# Patient Record
(demographics unavailable — no encounter records)

---

## 2025-05-23 NOTE — DISCUSSION/SUMMARY
[de-identified] : Discussed the nature of the diagnosis and risk and benefits of different modalities of treatment. LT dorsal ganglion cyst  LT ECU tendinitis  X rays reviewed & discussed He will splint  Rx provided He will apply warm compresses & take MDP He will apply MRI  Rx provided RTO after MRI  RTO 3-4 weeks

## 2025-05-23 NOTE — HISTORY OF PRESENT ILLNESS
[8] : 8 [2] : 2 [Dull/Aching] : dull/aching [Sharp] : sharp [Constant] : constant [Household chores] : household chores [Leisure] : leisure [de-identified] : 37 year old male presents LT wrist pain for ~ 1 month. No injury.  [] : no [FreeTextEntry1] : Left wrist [FreeTextEntry5] : Pain x 4-5 weeks ago. No known injury Pain with supporting himself in actions like a pushup, or lifting

## 2025-05-23 NOTE — PHYSICAL EXAM
[Dorsal Wrist] : dorsal wrist [TFCC] : TFCC [Left] : left wrist [] : negative Siddiqi's [FreeTextEntry3] : Mild dorsal wrist prominence [de-identified] : ECU, pain with forced supination, mild discomfort with forced pronation [FreeTextEntry9] : No DRUJ instability  [FreeTextEntry8] : Ulnar Negative variance. No SL widening on clenched fist. NO VISI or DISI